# Patient Record
Sex: MALE | Race: WHITE | NOT HISPANIC OR LATINO | Employment: FULL TIME | URBAN - NONMETROPOLITAN AREA
[De-identification: names, ages, dates, MRNs, and addresses within clinical notes are randomized per-mention and may not be internally consistent; named-entity substitution may affect disease eponyms.]

---

## 2024-07-18 ENCOUNTER — APPOINTMENT (OUTPATIENT)
Dept: URGENT CARE | Facility: CLINIC | Age: 49
End: 2024-07-18

## 2025-02-27 ENCOUNTER — APPOINTMENT (OUTPATIENT)
Dept: RADIOLOGY | Facility: IMAGING CENTER | Age: 50
End: 2025-02-27
Payer: COMMERCIAL

## 2025-02-27 ENCOUNTER — OCCUPATIONAL MEDICINE (OUTPATIENT)
Dept: URGENT CARE | Facility: CLINIC | Age: 50
End: 2025-02-27
Payer: COMMERCIAL

## 2025-02-27 ENCOUNTER — APPOINTMENT (OUTPATIENT)
Dept: RADIOLOGY | Facility: IMAGING CENTER | Age: 50
End: 2025-02-27

## 2025-02-27 VITALS
BODY MASS INDEX: 36.57 KG/M2 | WEIGHT: 261.2 LBS | HEART RATE: 77 BPM | DIASTOLIC BLOOD PRESSURE: 76 MMHG | SYSTOLIC BLOOD PRESSURE: 116 MMHG | HEIGHT: 71 IN | RESPIRATION RATE: 16 BRPM | TEMPERATURE: 97.3 F | OXYGEN SATURATION: 93 %

## 2025-02-27 DIAGNOSIS — M54.2 NECK PAIN: ICD-10-CM

## 2025-02-27 DIAGNOSIS — S16.1XXA STRAIN OF NECK MUSCLE, INITIAL ENCOUNTER: ICD-10-CM

## 2025-02-27 PROCEDURE — 72040 X-RAY EXAM NECK SPINE 2-3 VW: CPT | Mod: TC | Performed by: RADIOLOGY

## 2025-02-27 RX ORDER — CYCLOBENZAPRINE HCL 5 MG
5-10 TABLET ORAL 3 TIMES DAILY PRN
Qty: 20 TABLET | Refills: 0 | Status: SHIPPED | OUTPATIENT
Start: 2025-02-27

## 2025-02-27 RX ORDER — ATORVASTATIN CALCIUM 40 MG/1
40 TABLET, FILM COATED ORAL
COMMUNITY
Start: 2024-12-24

## 2025-02-27 RX ORDER — PREDNISONE 20 MG/1
40 TABLET ORAL DAILY
Qty: 10 TABLET | Refills: 0 | Status: SHIPPED | OUTPATIENT
Start: 2025-02-27 | End: 2025-03-04

## 2025-02-27 RX ORDER — METOPROLOL TARTRATE 25 MG/1
1 TABLET, FILM COATED ORAL 2 TIMES DAILY
COMMUNITY
Start: 2025-02-03

## 2025-02-27 ASSESSMENT — ENCOUNTER SYMPTOMS: NECK PAIN: 1

## 2025-02-27 NOTE — LETTER
PHYSICIAN’S AND CHIROPRACTIC PHYSICIAN'S   PROGRESS REPORT   CERTIFICATION OF DISABILITY Claim Number:     Social Security Number:    Patient’s Name: Tiny Fang Date of Injury: 2/27/2025   Employer: [employer] Name of MCO (if applicable):      Patient’s Job Description/Occupation: Maintenance       Previous Injuries/Diseases/Surgeries Contributing to the Condition:         Diagnosis: (S16.1XXA) Strain of neck muscle, initial encounter      Related to the Industrial Injury? Yes     Explain: The patient presents today for a work-related injury.  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 He experienced a popping sensation in the right side of his neck, followed by significant pain. The pain is described as radiating from his neck to his shoulder, akin to an electrical fire. He reports no midline tenderness but notes persistent pain during range of motion exercises. Despite the discomfort, he retains full mobility in his shoulder. He speculates that the injury may be due to muscle strain. His medical history includes two herniated discs, one of which was surgically fused, while the other was deemed not severe enough to warrant intervention.      Objective Medical Findings: Physical Exam  Vitals and nursing note reviewed.   Constitutional:       General: He is not in acute distress.     Appearance: Normal appearance. He is normal weight. He is not ill-appearing, toxic-appearing or diaphoretic.   HENT:      Head: Normocephalic and atraumatic.   Neck:      Comments: Tenderness palpated along trapezius muscle on right  Musculoskeletal:      Right shoulder: No swelling, deformity, effusion, laceration, tenderness, bony tenderness or crepitus. Normal range of motion. Normal strength. Normal pulse.      Cervical back: No edema, erythema, signs of trauma, rigidity, torticollis or crepitus. Pain with movement and muscular tenderness present.  No spinous process tenderness. Normal range of motion.   Neurological:      Mental Status: He is alert.              None - Discharged                         Stable  Yes                 Ratable  No        Generally Improved                         Condition Worsened                  Condition Same  May Have Suffered a Permanent Disability No     Treatment Plan:    The patient will be prescribed prednisone and muscle relaxer. I have advised the patient not to take the muscle relaxer while working or driving. He is to return back for reevaluation in 4 days         No Change in Therapy                  PT/OT Prescribed                      Medication May be Used While Working        Case Management                          PT/OT Discontinued    Consultation    Further Diagnostic Studies:    Prescription(s)           Muscle relaxer not to be taken while working     Released to FULL DUTY /No Restrictions on (Date):       Certified TOTALLY TEMPORARILY DISABLED (Indicate Dates) From:   To:    X  Released to RESTRICTED/Modified Duty on (Date): From: 2/27/2025 To: 3/4/2025  Restrictions Are:         No Sitting    No Standing X   No Pulling Other:         No Bending at Waist     No Stooping     No Lifting    X    No Carrying     No Walking Lifting Restricted to (lbs.):  < or = to 10 pounds    X   No Pushing        No Climbing     No Reaching Above Shoulders       Date of Next Visit:  3/4/2025 Date of this Exam: 2/27/2025 Physician/Chiropractic Physician Name: BRIAN Ochoa Physician/Chiropractic Physician Signature:  Shailesh Yeh DO [facility]:  [address] [place] [address], [address] [place], [place] [address] - Telephone [phone] [place]:  [address] [place] [address], [address] [place], [place] [address] - Telephone [phone]    [email or web address]  D-39 (Rev. 10/24)

## 2025-02-27 NOTE — PROGRESS NOTES
"Verbal consent was acquired by the patient to use [employer] listening note generation during this visit   Subjective:   Tiny Fang is a 49 y.o. male who presents for Shoulder Injury ( NEW DOI 2/27/25, CCSD, R shoulder, R side of neck, felt a pop )      HPI:  History of Present Illness  The patient presents today for a work-related injury.  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 He experienced a popping sensation in the right side of his neck, followed by significant pain. The pain is described as radiating from his neck to his shoulder, akin to an electrical fire. He reports no midline tenderness but notes persistent pain during range of motion exercises. Despite the discomfort, he retains full mobility in his shoulder. He speculates that the injury may be due to muscle strain. His medical history includes two herniated discs, one of which was surgically fused, while the other was deemed not severe enough to warrant intervention.       Review of Systems   Musculoskeletal:  Positive for neck pain.     Problem list, medications, and allergies reviewed by myself today in Epic.     Objective:     /76 (BP Location: Left arm, Patient Position: Sitting, BP Cuff Size: Adult long)   Pulse 77   Temp 36.3 °C (97.3 °F) (Temporal)   Resp 16   Ht 1.803 m (5' 11\")   Wt 118 kg (261 lb 3.2 oz)   SpO2 93%   BMI 36.43 kg/m²     Physical Exam  Vitals and nursing note reviewed.   Constitutional:       General: He is not in acute distress.     Appearance: Normal appearance. He is normal weight. He is not ill-appearing, toxic-appearing or diaphoretic.   HENT:      Head: Normocephalic and atraumatic.   Neck:      Comments: Tenderness palpated along trapezius muscle on right  Musculoskeletal:      Right shoulder: No swelling, deformity, effusion, laceration, tenderness, bony tenderness or crepitus. Normal range of motion. Normal strength. " Normal pulse.      Cervical back: No edema, erythema, signs of trauma, rigidity, torticollis or crepitus. Pain with movement and muscular tenderness present. No spinous process tenderness. Normal range of motion.   Neurological:      Mental Status: He is alert.         Assessment/Plan:     Diagnosis and associated orders:   1. Strain of neck muscle, initial encounter  DX-CERVICAL SPINE-2 OR 3 VIEWS    predniSONE (DELTASONE) 20 MG Tab    cyclobenzaprine (FLEXERIL) 5 mg tablet         RADIOLOGY RESULTS   DX-CERVICAL SPINE-2 OR 3 VIEWS  Result Date: 2/27/2025 2/27/2025 1:23 PM HISTORY/REASON FOR EXAM:  Pain Following Trauma. Neck injury today. TECHNIQUE/EXAM DESCRIPTION AND NUMBER OF VIEWS: Cervical spine series, 4 views. COMPARISON:  Intraoperative fluoroscopic spot films cervical spine 7/17/2014 FINDINGS: Again noted, patient is status post anterior cervical discectomy and interbody fusion with interbody graft and anterior plate and screw fixation C5-C6. Solid-appearing fusion with no evidence of hardware failure or migration. Alignment is anatomic. The disc spaces are maintained at the nonoperative levels. There are no significant degenerative changes at the nonoperative levels. The prevertebral soft tissues are unremarkable.     1.  Postoperative C5-6 anterior cervical discectomy and fusion. 2.  Otherwise, unremarkable cervical spine plain films.              Comments/MDM:   Pt is clinically stable at today's acute urgent care visit.  No acute distress noted. Appropriate for outpatient management at this time.     Assessment & Plan    The majority of the tenderness appears to be muscular in nature. X ray is negative for any acute changes. The patient will be prescribed prednisone and muscle relaxer. I have advised the patient not to take the muscle relaxer while working or driving. He is to return back for reevaluation in 4 days. Work restrictions per d39         Discussed DDx, management options (risks,benefits,  and alternatives to planned treatment), natural progression and supportive care.  Expressed understanding and the treatment plan was agreed upon. Questions were encouraged and answered   Return to urgent care prn if new or worsening sx or if there is no improvement in condition prn.    Educated in Red flags and indications to immediately call 911 or present to the Emergency Department.   Advised the patient to follow-up with the primary care physician for recheck, reevaluation, and consideration of further management.    I personally reviewed prior external notes and test results pertinent to today's visit.  I have independently reviewed and interpreted all diagnostics ordered during this urgent care acute visit.       Please note that this dictation was created using voice recognition software. I have made a reasonable attempt to correct obvious errors, but I expect that there are errors of grammar and possibly content that I did not discover before finalizing the note.    This note was electronically signed by KEVAN Ballard

## 2025-02-27 NOTE — LETTER
"    EMPLOYEE’S CLAIM FOR COMPENSATION/ REPORT OF INITIAL TREATMENT  FORM C-4  PLEASE TYPE OR PRINT    EMPLOYEE’S CLAIM - PROVIDE ALL INFORMATION REQUESTED   First Name                    GALILEA Franks                  Last Name  Annalisa Birthdate                    1975                Sex  Male Claim Number (Insurer’s Use Only)     Home Address  [address] Age  49 y.o. Height  1.803 m (5' 11\") Weight  118 kg (261 lb 3.2 oz) Social Security Number     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 (if applicable)  wrenching on a clean out opening    How did this injury or occupational disease occur? (Be specific and answer in detail. Use additional sheet if necessary)  over straining neck/ shoulder   If you believe that you have an occupational disease, when did you first have knowledge of the disability and its relationship to your employment?  no Witnesses to the Accident (if applicable)  no      Nature of Injury or Occupational Disease  Strain  Part(s) of Body Injured or Affected  Thumb (R) Soft Tissue " - Neck     I CERTIFY THAT THE ABOVE IS TRUE AND CORRECT TO T HE BEST OF MY KNOWLEDGE AND THAT I HAVE PROVIDED THIS INFORMATION IN ORDER TO OBTAIN THE BENEFITS OF [place]CC[place], OR CHAPTER [place]).  I HEREBY AUTHORIZE ANY PHYSICIAN, CHIROPRACTOR, SURGEON, PRACTITIONER OR ANY OTHER PERSON, ANY HOSPITAL, INCLUDING [facility] OR [facility], ANY  MEDICAL SERVICE ORGANIZATION, ANY INSURANCE COMPANY, OR OTHER INSTITUTION OR ORGANIZATION TO RELEASE TO EACH OTHER, ANY MEDICAL OR OTHER INFORMATION, INCLUDING BENEFITS PAID OR PAYABLE, PERTINENT TO THIS INJURY OR DISEASE, EXCEPT INFORMATION RELATIVE TO DIAGNOSIS, TREATMENT AND/OR COUNSELING FOR AIDS, PSYCHOLOGICAL CONDITIONS, ALCOHOL OR CONTROLLED SUBSTANCES, FOR WHICH I MUST GIVE SPECIFIC AUTHORIZATION.  A PHOTOSTAT OF THIS AUTHORIZATION SHALL BE VALID AS THE ORIGINAL.     Date   Place Employee’s Original or  *Electronic Signature   THIS REPORT MUST BE COMPLETED AND MAILED WITHIN 3 WORKING DAYS OF TREATMENT   Place  [facility]    Name of Facility  [facility]   Date 2/27/2025 Diagnosis and Description of Injury or Occupational Disease  (S16.1XXA) Strain of neck muscle, initial encounter  The encounter diagnosis was Strain of neck muscle, initial encounter. Is there evidence that the injured employee was under the influence of alcohol and/or another controlled substance at the time of accident?  []No  [] Yes (if yes, please explain)   Hour 1:03 PM  No   Treatment: Muscle relaxer, prednisone     Have you advised the patient to remain off work five days or more?   [] Yes  [] No        No           If yes, indicate dates: From   To      If no, is the injured employee capable of: [] full duty No   [] modified duty Yes  If yes to modified duty, specify any limitations / restrictions:   No lifting, pulling, or pushing greater than 10 lbs    X-Ray Findings:  Negative    From information given by the employee, together with medical evidence, can you directly connect this injury or occupational disease as job incurred?  []Yes   [] No Yes    Is additional medical care by a physician indicated? []Yes [] No  Yes    Do you know of any previous injury or disease contributing to this condition or occupational disease? []Yes [] No (Explain if yes)                          No   Date  2/27/2025 Print Health Care Provider’s Name  BRIAN Ochoa I certify that the employer’s copy of  this form was delivered to the employer on:   Address  [address],   [address] INSURER'S USE ONLY                       [place]  [address] Provider’s Tax ID Number  [id number]   Telephone  Dept: [phone]    Health Care Provider’s Original or Electronic Signature  e-SignCARTER, MIR TORRES Degree (MD,DO, DC,PA-C,APRN)  APRN  Choose (if applicable)      ORIGINAL - TREATING HEALTHCARE PROVIDER PAGE 2 - INSURER/TPA PAGE 3 - EMPLOYER PAGE 4 - EMPLOYEE             Form C-4 (rev.08/23)

## 2025-03-04 ENCOUNTER — OCCUPATIONAL MEDICINE (OUTPATIENT)
Dept: URGENT CARE | Facility: CLINIC | Age: 50
End: 2025-03-04
Payer: OTHER MISCELLANEOUS

## 2025-03-04 VITALS
WEIGHT: 261 LBS | RESPIRATION RATE: 18 BRPM | HEART RATE: 76 BPM | BODY MASS INDEX: 36.54 KG/M2 | HEIGHT: 71 IN | SYSTOLIC BLOOD PRESSURE: 122 MMHG | OXYGEN SATURATION: 98 % | DIASTOLIC BLOOD PRESSURE: 74 MMHG | TEMPERATURE: 97.9 F

## 2025-03-04 DIAGNOSIS — S16.1XXD CERVICAL STRAIN, SUBSEQUENT ENCOUNTER: ICD-10-CM

## 2025-03-04 PROCEDURE — 3074F SYST BP LT 130 MM HG: CPT | Performed by: PHYSICIAN ASSISTANT

## 2025-03-04 PROCEDURE — 3078F DIAST BP <80 MM HG: CPT | Performed by: PHYSICIAN ASSISTANT

## 2025-03-04 PROCEDURE — 99213 OFFICE O/P EST LOW 20 MIN: CPT | Performed by: PHYSICIAN ASSISTANT

## 2025-03-04 ASSESSMENT — ENCOUNTER SYMPTOMS
MYALGIAS: 0
NECK PAIN: 0
DIZZINESS: 0
HEADACHES: 0

## 2025-03-04 NOTE — LETTER
PHYSICIAN’S AND CHIROPRACTIC PHYSICIAN'S   PROGRESS REPORT   CERTIFICATION OF DISABILITY Claim Number:     Social Security Number:    Patient’s Name: Tiny Fang Date of Injury: 2/27/2025   Employer: [employer] Name of MCO (if applicable):      Patient’s Job Description/Occupation: Maintenance       Previous Injuries/Diseases/Surgeries Contributing to the Condition:  Prior C5-C6 fusion      Diagnosis: No diagnosis found.      Related to the Industrial Injury? Yes     Explain: HPI:  Very pleasant 49-year-old male present to the clinic for follow-up regarding work-related injury.  Patient states his neck and shoulder pain have fully improved since his last visit.  He completed his prednisone and is taking Flexeril as needed.  Denies any limitations in cervical range of motion or movement of the upper extremities.  No radicular symptoms to the upper extremities.  No associated headaches or dizziness.  Would like to return to full duty.      Objective Medical Findings: Constitutional: Pt is oriented to person, place, and time.  Appears well-developed and well-nourished. No distress.   Eyes: Conjunctivae are normal.   Cardiovascular: Normal rate.    Pulmonary/Chest: Effort normal.   Musculoskeletal: Cervical exam: No midline tenderness to palpation.  No obvious deformity or step-off.  No tenderness over the bilateral cervical paraspinal muscles or trapezius.  Patient maintains full cervical range of motion.  Full and pain-free range of motion of the bilateral upper extremities.  Upper extremity strength 5/5 bilaterally.  Neurological: Pt is alert and oriented to person, place, and time. Coordination normal.   Skin: Skin is warm. Pt is not diaphoretic. No erythema.   Psychiatric: Pt has a normal mood and affect.  Behavior is normal.         X   None - Discharged                         Stable  No                 Ratable  No     X   Generally Improved                         Condition Worsened                   Condition Same  May Have Suffered a Permanent Disability No     Treatment Plan:    Patient symptoms fully improved.  May continue with anti-inflammatories if needed.  Cleared to return to full duty without restriction.    Discharge MMI.           No Change in Therapy                  PT/OT Prescribed                      Medication May be Used While Working        Case Management                          PT/OT Discontinued    Consultation    Further Diagnostic Studies:    Prescription(s)               X  Released to FULL DUTY /No Restrictions on (Date):  3/4/2025    Certified TOTALLY TEMPORARILY DISABLED (Indicate Dates) From:   To:      Released to RESTRICTED/Modified Duty on (Date): From:   To:    Restrictions Are:         No Sitting    No Standing    No Pulling Other:         No Bending at Waist     No Stooping     No Lifting        No Carrying     No Walking Lifting Restricted to (lbs.):          No Pushing        No Climbing     No Reaching Above Shoulders       Date of Next Visit:    Date of this Exam: 3/4/2025 Physician/Chiropractic Physician Name: Stephan Delacruz P.A.-C. Physician/Chiropractic Physician Signature:  Shailesh Yeh DO MPH     [place]:  [address] [place] [address], [address] [place], [place] [address] - Telephone [phone] [place]:  [address] [place] [address], [address] [place], [place] [address] - Telephone [phone]    [email or web address]  D-39 (Rev. 10/24)

## 2025-03-04 NOTE — PROGRESS NOTES
Subjective:     CHIEF COMPLAINT  Chief Complaint   Patient presents with    Work-Related Injury     W/C follow       HPI:  Very pleasant 49-year-old male present to the clinic for follow-up regarding work-related injury.  Patient states his neck and shoulder pain have fully improved since his last visit.  He completed his prednisone and is taking Flexeril as needed.  Denies any limitations in cervical range of motion or movement of the upper extremities.  No radicular symptoms to the upper extremities.  No associated headaches or dizziness.  Would like to return to full duty.    REVIEW OF SYSTEMS  Review of Systems   Musculoskeletal:  Negative for joint pain, myalgias and neck pain.   Neurological:  Negative for dizziness and headaches.       PAST MEDICAL HISTORY  Patient Active Problem List    Diagnosis Date Noted    Brachial neuritis or radiculitis 07/17/2014    Neck pain 10/10/2012       SURGICAL HISTORY   has a past surgical history that includes cervical disk and fusion anterior (7/17/2014) and hernia repair.    ALLERGIES  No Known Allergies    CURRENT MEDICATIONS  Home Medications       Reviewed by Stephan Delacruz P.A.-C. (Physician Assistant) on 03/04/25 at 0934  Med List Status: <None>     Medication Last Dose Status   atorvastatin (LIPITOR) 40 MG Tab Taking Active   calcium carbonate (TUMS) 500 MG CHEW Taking Active   cyclobenzaprine (FLEXERIL) 5 mg tablet Taking Active   ibuprofen (MOTRIN) 800 MG Tab PRN Active   indomethacin (INDOCIN) 50 MG Cap Not Taking Active   metoprolol tartrate (LOPRESSOR) 25 MG Tab Taking Active   predniSONE (DELTASONE) 20 MG Tab Taking Active                    SOCIAL HISTORY  Social History     Tobacco Use    Smoking status: Never    Smokeless tobacco: Never   Substance and Sexual Activity    Alcohol use: Yes     Alcohol/week: 3.0 oz     Types: 6 Standard drinks or equivalent per week     Comment: 6 shots per day on the weekends only    Drug use: No    Sexual activity: Yes      "Partners: Female     Birth control/protection: Coitus Interruptus       FAMILY HISTORY  History reviewed. No pertinent family history.       Objective:     VITAL SIGNS: /74   Pulse 76   Temp 36.6 °C (97.9 °F) (Temporal)   Resp 18   Ht 1.803 m (5' 11\")   Wt 118 kg (261 lb)   SpO2 98%   BMI 36.40 kg/m²     PHYSICAL EXAM  Physical Exam    Constitutional: Pt is oriented to person, place, and time.  Appears well-developed and well-nourished. No distress.   Eyes: Conjunctivae are normal.   Cardiovascular: Normal rate.    Pulmonary/Chest: Effort normal.   Musculoskeletal: Cervical exam: No midline tenderness to palpation.  No obvious deformity or step-off.  No tenderness over the bilateral cervical paraspinal muscles or trapezius.  Patient maintains full cervical range of motion.  Full and pain-free range of motion of the bilateral upper extremities.  Upper extremity strength 5/5 bilaterally.  Neurological: Pt is alert and oriented to person, place, and time. Coordination normal.   Skin: Skin is warm. Pt is not diaphoretic. No erythema.   Psychiatric: Pt has a normal mood and affect.  Behavior is normal.     RADIOLOGY RESULTS   DX-CERVICAL SPINE-2 OR 3 VIEWS  Result Date: 2/27/2025 2/27/2025 1:23 PM HISTORY/REASON FOR EXAM:  Pain Following Trauma. Neck injury today. TECHNIQUE/EXAM DESCRIPTION AND NUMBER OF VIEWS: Cervical spine series, 4 views. COMPARISON:  Intraoperative fluoroscopic spot films cervical spine 7/17/2014 FINDINGS: Again noted, patient is status post anterior cervical discectomy and interbody fusion with interbody graft and anterior plate and screw fixation C5-C6. Solid-appearing fusion with no evidence of hardware failure or migration. Alignment is anatomic. The disc spaces are maintained at the nonoperative levels. There are no significant degenerative changes at the nonoperative levels. The prevertebral soft tissues are unremarkable.     1.  Postoperative C5-6 anterior cervical discectomy " and fusion. 2.  Otherwise, unremarkable cervical spine plain films.         Assessment/Plan:     1. Cervical strain, subsequent encounter              MDM/Comments:    Patient symptoms fully improved.  May continue with anti-inflammatories if needed.  Cleared to return to full duty without restriction.    Discharge MMI.    Differential diagnosis, natural history, supportive care, and indications for immediate follow-up discussed. All questions answered. Patient agrees with the plan of care.    Follow-up as needed if symptoms worsen or fail to improve to PCP, Urgent care or Emergency Room.    I have personally reviewed prior external notes and test results pertinent to today's visit.  I have independently reviewed and interpreted all diagnostics ordered during this urgent care acute visit.   Discussed management options (risks,benefits, and alternatives to treatment). Pt expresses understanding and the treatment plan was agreed upon. Questions were encouraged and answered to pt's satisfaction.    Please note that this dictation was created using voice recognition software. I have made a reasonable attempt to correct obvious errors, but I expect that there are errors of grammar and possibly content that I did not discover before finalizing the note.